# Patient Record
Sex: FEMALE | Race: BLACK OR AFRICAN AMERICAN | Employment: FULL TIME | URBAN - METROPOLITAN AREA
[De-identification: names, ages, dates, MRNs, and addresses within clinical notes are randomized per-mention and may not be internally consistent; named-entity substitution may affect disease eponyms.]

---

## 2022-01-04 ENCOUNTER — APPOINTMENT (OUTPATIENT)
Dept: CT IMAGING | Age: 32
DRG: 070 | End: 2022-01-04
Payer: COMMERCIAL

## 2022-01-04 ENCOUNTER — HOSPITAL ENCOUNTER (INPATIENT)
Age: 32
LOS: 1 days | Discharge: LEFT BEFORE DISCHARGE | DRG: 070 | End: 2022-01-05
Attending: EMERGENCY MEDICINE | Admitting: INTERNAL MEDICINE
Payer: COMMERCIAL

## 2022-01-04 ENCOUNTER — APPOINTMENT (OUTPATIENT)
Dept: GENERAL RADIOLOGY | Age: 32
DRG: 070 | End: 2022-01-04
Payer: COMMERCIAL

## 2022-01-04 DIAGNOSIS — R41.82 ALTERED MENTAL STATUS, UNSPECIFIED ALTERED MENTAL STATUS TYPE: Primary | ICD-10-CM

## 2022-01-04 PROBLEM — G93.40 ACUTE ENCEPHALOPATHY: Status: ACTIVE | Noted: 2022-01-04

## 2022-01-04 LAB
ACETAMINOPHEN LEVEL: <5 UG/ML (ref 10–30)
ALBUMIN SERPL-MCNC: 4.4 G/DL (ref 3.4–5)
ALP BLD-CCNC: 58 U/L (ref 40–129)
ALT SERPL-CCNC: <5 U/L (ref 10–40)
AMMONIA: 15 UMOL/L (ref 11–51)
AMPHETAMINE SCREEN, URINE: ABNORMAL
ANION GAP SERPL CALCULATED.3IONS-SCNC: 22 MMOL/L (ref 3–16)
AST SERPL-CCNC: 16 U/L (ref 15–37)
BARBITURATE SCREEN URINE: ABNORMAL
BASE EXCESS VENOUS: 3 MMOL/L (ref -2–3)
BASOPHILS ABSOLUTE: 0 K/UL (ref 0–0.2)
BASOPHILS RELATIVE PERCENT: 0.5 %
BENZODIAZEPINE SCREEN, URINE: ABNORMAL
BILIRUB SERPL-MCNC: 0.4 MG/DL (ref 0–1)
BILIRUBIN DIRECT: <0.2 MG/DL (ref 0–0.3)
BILIRUBIN URINE: ABNORMAL
BILIRUBIN, INDIRECT: ABNORMAL MG/DL (ref 0–1)
BLOOD, URINE: ABNORMAL
BUN BLDV-MCNC: 38 MG/DL (ref 7–20)
CALCIUM SERPL-MCNC: 9 MG/DL (ref 8.3–10.6)
CANNABINOID SCREEN URINE: POSITIVE
CARBOXYHEMOGLOBIN: 1.3 % (ref 0–1.5)
CHLORIDE BLD-SCNC: 91 MMOL/L (ref 99–110)
CLARITY: ABNORMAL
CO2: 24 MMOL/L (ref 21–32)
COCAINE METABOLITE SCREEN URINE: ABNORMAL
COLOR: YELLOW
CORTISOL TOTAL: 17.9 UG/DL
CREAT SERPL-MCNC: 12.8 MG/DL (ref 0.6–1.1)
EKG ATRIAL RATE: 53 BPM
EKG ATRIAL RATE: 59 BPM
EKG DIAGNOSIS: NORMAL
EKG DIAGNOSIS: NORMAL
EKG P AXIS: 40 DEGREES
EKG P AXIS: 56 DEGREES
EKG P-R INTERVAL: 186 MS
EKG P-R INTERVAL: 196 MS
EKG Q-T INTERVAL: 462 MS
EKG Q-T INTERVAL: 462 MS
EKG QRS DURATION: 72 MS
EKG QRS DURATION: 78 MS
EKG QTC CALCULATION (BAZETT): 433 MS
EKG QTC CALCULATION (BAZETT): 457 MS
EKG R AXIS: 36 DEGREES
EKG R AXIS: 51 DEGREES
EKG T AXIS: 27 DEGREES
EKG T AXIS: 43 DEGREES
EKG VENTRICULAR RATE: 53 BPM
EKG VENTRICULAR RATE: 59 BPM
EOSINOPHILS ABSOLUTE: 0 K/UL (ref 0–0.6)
EOSINOPHILS RELATIVE PERCENT: 0 %
ETHANOL: NORMAL MG/DL (ref 0–0.08)
GFR AFRICAN AMERICAN: 4
GFR NON-AFRICAN AMERICAN: 3
GLUCOSE BLD-MCNC: 126 MG/DL (ref 70–99)
GLUCOSE BLD-MCNC: 67 MG/DL (ref 70–99)
GLUCOSE BLD-MCNC: 68 MG/DL (ref 70–99)
GLUCOSE URINE: NEGATIVE MG/DL
HCG QUALITATIVE: NEGATIVE
HCO3 VENOUS: 28.2 MMOL/L (ref 24–28)
HCT VFR BLD CALC: 41.6 % (ref 36–48)
HEMOGLOBIN, VEN, REDUCED: 4.4 %
HEMOGLOBIN: 13.3 G/DL (ref 12–16)
KETONES, URINE: ABNORMAL MG/DL
LACTIC ACID: 2 MMOL/L (ref 0.4–2)
LEUKOCYTE ESTERASE, URINE: ABNORMAL
LIPASE: 29 U/L (ref 13–60)
LV EF: 60 %
LVEF MODALITY: NORMAL
LYMPHOCYTES ABSOLUTE: 1.1 K/UL (ref 1–5.1)
LYMPHOCYTES RELATIVE PERCENT: 27 %
Lab: ABNORMAL
MCH RBC QN AUTO: 27.8 PG (ref 26–34)
MCHC RBC AUTO-ENTMCNC: 32.1 G/DL (ref 31–36)
MCV RBC AUTO: 86.7 FL (ref 80–100)
METHADONE SCREEN, URINE: ABNORMAL
METHEMOGLOBIN VENOUS: 0.4 % (ref 0–1.5)
MICROSCOPIC EXAMINATION: YES
MONOCYTES ABSOLUTE: 0.6 K/UL (ref 0–1.3)
MONOCYTES RELATIVE PERCENT: 14.4 %
NEUTROPHILS ABSOLUTE: 2.4 K/UL (ref 1.7–7.7)
NEUTROPHILS RELATIVE PERCENT: 58.1 %
NITRITE, URINE: NEGATIVE
O2 SAT, VEN: 96 %
OPIATE SCREEN URINE: ABNORMAL
OXYCODONE URINE: ABNORMAL
PCO2, VEN: 44.5 MMHG (ref 41–51)
PDW BLD-RTO: 16 % (ref 12.4–15.4)
PERFORMED ON: ABNORMAL
PERFORMED ON: ABNORMAL
PH UA: 6
PH UA: 6 (ref 5–8)
PH VENOUS: 7.41 (ref 7.35–7.45)
PHENCYCLIDINE SCREEN URINE: ABNORMAL
PLATELET # BLD: 138 K/UL (ref 135–450)
PMV BLD AUTO: 8.3 FL (ref 5–10.5)
PO2, VEN: 77.9 MMHG (ref 25–40)
POTASSIUM REFLEX MAGNESIUM: 4.6 MMOL/L (ref 3.5–5.1)
PRO-BNP: 2035 PG/ML (ref 0–124)
PROPOXYPHENE SCREEN: ABNORMAL
PROTEIN UA: >=300 MG/DL
RAPID INFLUENZA  B AGN: NEGATIVE
RAPID INFLUENZA A AGN: NEGATIVE
RBC # BLD: 4.8 M/UL (ref 4–5.2)
SALICYLATE, SERUM: 0.8 MG/DL (ref 15–30)
SARS-COV-2, NAAT: NOT DETECTED
SODIUM BLD-SCNC: 137 MMOL/L (ref 136–145)
SPECIFIC GRAVITY UA: >=1.03 (ref 1–1.03)
TCO2 CALC VENOUS: 30 MMOL/L
TOTAL PROTEIN: 8.9 G/DL (ref 6.4–8.2)
TROPONIN: 0.03 NG/ML
TROPONIN: 0.03 NG/ML
TSH SERPL DL<=0.05 MIU/L-ACNC: 182 UIU/ML (ref 0.27–4.2)
URINE TYPE: ABNORMAL
UROBILINOGEN, URINE: 0.2 E.U./DL
WBC # BLD: 4.1 K/UL (ref 4–11)

## 2022-01-04 PROCEDURE — 96376 TX/PRO/DX INJ SAME DRUG ADON: CPT

## 2022-01-04 PROCEDURE — 80048 BASIC METABOLIC PNL TOTAL CA: CPT

## 2022-01-04 PROCEDURE — 84443 ASSAY THYROID STIM HORMONE: CPT

## 2022-01-04 PROCEDURE — 84484 ASSAY OF TROPONIN QUANT: CPT

## 2022-01-04 PROCEDURE — 85025 COMPLETE CBC W/AUTO DIFF WBC: CPT

## 2022-01-04 PROCEDURE — G0378 HOSPITAL OBSERVATION PER HR: HCPCS

## 2022-01-04 PROCEDURE — 36415 COLL VENOUS BLD VENIPUNCTURE: CPT

## 2022-01-04 PROCEDURE — 6360000002 HC RX W HCPCS: Performed by: INTERNAL MEDICINE

## 2022-01-04 PROCEDURE — 82077 ASSAY SPEC XCP UR&BREATH IA: CPT

## 2022-01-04 PROCEDURE — 96375 TX/PRO/DX INJ NEW DRUG ADDON: CPT

## 2022-01-04 PROCEDURE — 87804 INFLUENZA ASSAY W/OPTIC: CPT

## 2022-01-04 PROCEDURE — 87635 SARS-COV-2 COVID-19 AMP PRB: CPT

## 2022-01-04 PROCEDURE — 2500000003 HC RX 250 WO HCPCS: Performed by: INTERNAL MEDICINE

## 2022-01-04 PROCEDURE — 80307 DRUG TEST PRSMV CHEM ANLYZR: CPT

## 2022-01-04 PROCEDURE — 83605 ASSAY OF LACTIC ACID: CPT

## 2022-01-04 PROCEDURE — 83880 ASSAY OF NATRIURETIC PEPTIDE: CPT

## 2022-01-04 PROCEDURE — 99284 EMERGENCY DEPT VISIT MOD MDM: CPT

## 2022-01-04 PROCEDURE — 82140 ASSAY OF AMMONIA: CPT

## 2022-01-04 PROCEDURE — 83690 ASSAY OF LIPASE: CPT

## 2022-01-04 PROCEDURE — 80076 HEPATIC FUNCTION PANEL: CPT

## 2022-01-04 PROCEDURE — 2580000003 HC RX 258: Performed by: INTERNAL MEDICINE

## 2022-01-04 PROCEDURE — 1200000000 HC SEMI PRIVATE

## 2022-01-04 PROCEDURE — 80179 DRUG ASSAY SALICYLATE: CPT

## 2022-01-04 PROCEDURE — 71046 X-RAY EXAM CHEST 2 VIEWS: CPT

## 2022-01-04 PROCEDURE — 96374 THER/PROPH/DIAG INJ IV PUSH: CPT

## 2022-01-04 PROCEDURE — C8929 TTE W OR WO FOL WCON,DOPPLER: HCPCS

## 2022-01-04 PROCEDURE — 90935 HEMODIALYSIS ONE EVALUATION: CPT | Performed by: INTERNAL MEDICINE

## 2022-01-04 PROCEDURE — 82803 BLOOD GASES ANY COMBINATION: CPT

## 2022-01-04 PROCEDURE — 5A1D70Z PERFORMANCE OF URINARY FILTRATION, INTERMITTENT, LESS THAN 6 HOURS PER DAY: ICD-10-PCS | Performed by: INTERNAL MEDICINE

## 2022-01-04 PROCEDURE — 81001 URINALYSIS AUTO W/SCOPE: CPT

## 2022-01-04 PROCEDURE — 93005 ELECTROCARDIOGRAM TRACING: CPT | Performed by: INTERNAL MEDICINE

## 2022-01-04 PROCEDURE — 80143 DRUG ASSAY ACETAMINOPHEN: CPT

## 2022-01-04 PROCEDURE — 84703 CHORIONIC GONADOTROPIN ASSAY: CPT

## 2022-01-04 PROCEDURE — 93005 ELECTROCARDIOGRAM TRACING: CPT | Performed by: STUDENT IN AN ORGANIZED HEALTH CARE EDUCATION/TRAINING PROGRAM

## 2022-01-04 PROCEDURE — 90935 HEMODIALYSIS ONE EVALUATION: CPT

## 2022-01-04 PROCEDURE — 70450 CT HEAD/BRAIN W/O DYE: CPT

## 2022-01-04 PROCEDURE — 99223 1ST HOSP IP/OBS HIGH 75: CPT | Performed by: INTERNAL MEDICINE

## 2022-01-04 PROCEDURE — 82533 TOTAL CORTISOL: CPT

## 2022-01-04 RX ORDER — SODIUM CHLORIDE 0.9 % (FLUSH) 0.9 %
10 SYRINGE (ML) INJECTION PRN
Status: DISCONTINUED | OUTPATIENT
Start: 2022-01-04 | End: 2022-01-05 | Stop reason: HOSPADM

## 2022-01-04 RX ORDER — SODIUM CHLORIDE 0.9 % (FLUSH) 0.9 %
10 SYRINGE (ML) INJECTION EVERY 12 HOURS SCHEDULED
Status: DISCONTINUED | OUTPATIENT
Start: 2022-01-04 | End: 2022-01-05 | Stop reason: HOSPADM

## 2022-01-04 RX ORDER — LORAZEPAM 2 MG/ML
0.5 INJECTION INTRAMUSCULAR EVERY 6 HOURS PRN
Status: DISCONTINUED | OUTPATIENT
Start: 2022-01-04 | End: 2022-01-05 | Stop reason: HOSPADM

## 2022-01-04 RX ORDER — NALOXONE HYDROCHLORIDE 0.4 MG/ML
0.4 INJECTION, SOLUTION INTRAMUSCULAR; INTRAVENOUS; SUBCUTANEOUS ONCE
Status: COMPLETED | OUTPATIENT
Start: 2022-01-04 | End: 2022-01-04

## 2022-01-04 RX ORDER — ONDANSETRON 2 MG/ML
4 INJECTION INTRAMUSCULAR; INTRAVENOUS EVERY 6 HOURS PRN
Status: DISCONTINUED | OUTPATIENT
Start: 2022-01-04 | End: 2022-01-05 | Stop reason: HOSPADM

## 2022-01-04 RX ORDER — POTASSIUM CHLORIDE 7.45 MG/ML
10 INJECTION INTRAVENOUS PRN
Status: DISCONTINUED | OUTPATIENT
Start: 2022-01-04 | End: 2022-01-05 | Stop reason: HOSPADM

## 2022-01-04 RX ORDER — MYCOPHENOLATE MOFETIL 500 MG/1
TABLET ORAL 2 TIMES DAILY
COMMUNITY

## 2022-01-04 RX ORDER — PREDNISONE 10 MG/1
20 TABLET ORAL DAILY
COMMUNITY

## 2022-01-04 RX ORDER — NIFEDIPINE 90 MG/1
90 TABLET, FILM COATED, EXTENDED RELEASE ORAL DAILY
COMMUNITY

## 2022-01-04 RX ORDER — SEVELAMER CARBONATE 800 MG/1
1 TABLET, FILM COATED ORAL
COMMUNITY

## 2022-01-04 RX ORDER — ACETAMINOPHEN 325 MG/1
650 TABLET ORAL EVERY 6 HOURS PRN
Status: DISCONTINUED | OUTPATIENT
Start: 2022-01-04 | End: 2022-01-05 | Stop reason: HOSPADM

## 2022-01-04 RX ORDER — SODIUM CHLORIDE 9 MG/ML
25 INJECTION, SOLUTION INTRAVENOUS PRN
Status: DISCONTINUED | OUTPATIENT
Start: 2022-01-04 | End: 2022-01-05 | Stop reason: HOSPADM

## 2022-01-04 RX ORDER — ACETAMINOPHEN 650 MG/1
650 SUPPOSITORY RECTAL EVERY 6 HOURS PRN
Status: DISCONTINUED | OUTPATIENT
Start: 2022-01-04 | End: 2022-01-05 | Stop reason: HOSPADM

## 2022-01-04 RX ORDER — PROMETHAZINE HYDROCHLORIDE 25 MG/1
12.5 TABLET ORAL EVERY 6 HOURS PRN
Status: DISCONTINUED | OUTPATIENT
Start: 2022-01-04 | End: 2022-01-05 | Stop reason: HOSPADM

## 2022-01-04 RX ORDER — MAGNESIUM SULFATE IN WATER 40 MG/ML
2000 INJECTION, SOLUTION INTRAVENOUS PRN
Status: DISCONTINUED | OUTPATIENT
Start: 2022-01-04 | End: 2022-01-05 | Stop reason: HOSPADM

## 2022-01-04 RX ORDER — METHYLPREDNISOLONE SODIUM SUCCINATE 40 MG/ML
40 INJECTION, POWDER, LYOPHILIZED, FOR SOLUTION INTRAMUSCULAR; INTRAVENOUS ONCE
Status: COMPLETED | OUTPATIENT
Start: 2022-01-04 | End: 2022-01-04

## 2022-01-04 RX ORDER — DEXTROSE MONOHYDRATE 25 G/50ML
25 INJECTION, SOLUTION INTRAVENOUS ONCE
Status: COMPLETED | OUTPATIENT
Start: 2022-01-04 | End: 2022-01-04

## 2022-01-04 RX ADMIN — ONDANSETRON 4 MG: 2 INJECTION INTRAMUSCULAR; INTRAVENOUS at 19:37

## 2022-01-04 RX ADMIN — NALOXONE HYDROCHLORIDE 0.4 MG: 0.4 INJECTION, SOLUTION INTRAMUSCULAR; INTRAVENOUS; SUBCUTANEOUS at 04:07

## 2022-01-04 RX ADMIN — LORAZEPAM 0.5 MG: 2 INJECTION INTRAMUSCULAR; INTRAVENOUS at 16:30

## 2022-01-04 RX ADMIN — DEXTROSE MONOHYDRATE 25 G: 25 INJECTION, SOLUTION INTRAVENOUS at 04:06

## 2022-01-04 RX ADMIN — SODIUM CHLORIDE, PRESERVATIVE FREE 10 ML: 5 INJECTION INTRAVENOUS at 21:44

## 2022-01-04 RX ADMIN — SODIUM CHLORIDE, PRESERVATIVE FREE 10 ML: 5 INJECTION INTRAVENOUS at 13:45

## 2022-01-04 RX ADMIN — METHYLPREDNISOLONE SODIUM SUCCINATE 40 MG: 40 INJECTION, POWDER, FOR SOLUTION INTRAMUSCULAR; INTRAVENOUS at 13:44

## 2022-01-04 RX ADMIN — ONDANSETRON 4 MG: 2 INJECTION INTRAMUSCULAR; INTRAVENOUS at 06:00

## 2022-01-04 ASSESSMENT — ENCOUNTER SYMPTOMS
SHORTNESS OF BREATH: 0
BACK PAIN: 0
VOMITING: 1
ABDOMINAL PAIN: 0
COUGH: 0
EYE REDNESS: 0
EYE DISCHARGE: 0
RHINORRHEA: 0
NAUSEA: 1
DIARRHEA: 0

## 2022-01-04 NOTE — PROGRESS NOTES
4 Eyes Admission Assessment     I agree as the admission nurse that 2 RN's have performed a thorough Head to Toe Skin Assessment on the patient. ALL assessment sites listed below have been assessed on admission. Areas assessed by both nurses:   [x]   Head, Face, and Ears   [x]   Shoulders, Back, and Chest  [x]   Arms, Elbows, and Hands   [x]   Coccyx, Sacrum, and Ischium  [x]   Legs, Feet, and Heels        Does the Patient have Skin Breakdown?   No         Donovan Prevention initiated:  No   Wound Care Orders initiated:  No      [facility] nurse consulted for Pressure Injury (Stage 3,4, Unstageable, DTI, NWPT, and Complex wounds) or Donovan score 18 or lower:  No      Nurse 1 eSignature: Electronically signed by Jagjit Her RN on 1/4/22 at 7:17 AM EST    **SHARE this note so that the co-signing nurse is able to place an eSignature**    Nurse 2 eSignature: Electronically signed by Aarti Mejia RN on 1/4/22 at 7:54 AM EST

## 2022-01-04 NOTE — H&P
Hospital Medicine History & Physical      PCP: No primary care provider on file. Date of Admission: 1/4/2022    Date of Service: 1/4/22    Chief Complaint:  37 yo female admitted with change in mental status       History Of Present Illness:   dialsis Sunday - Tuesday Thursday Saturday    36 y.o. female who presented to [facility] w altered mentation. The patient does not relay a good history given her ongoing confusion and change in mentation. I discussed the case at length with patients partner Yosef Patel (Domestic Partner) [phone] (Mobile) who brought her into the ER. According to him, the patient went to bed ok, she took baclofen and tylenol and then woke up sometime last night and had altered mental status. He is not aware whether patient has been taking the correct medications. I reviewed care everywhere and it does not show much nor does it address correct dose of steroids or other medications. He states that the patient underwent hemodialysis on Sunday which was a change from her prior schedule of Tues/Thurs/saturday. Currently she is not relaying any complaints is however altered. Past Medical History:          Diagnosis Date    Kidney stone     Lupus [facility])        Past Surgical History:      History reviewed. No pertinent surgical history. Medications Prior to Admission:      Prior to Admission medications    Medication Sig Start Date End Date Taking? Authorizing Provider   NIFEdipine (ADALAT CC) 90 MG extended release tablet Take 90 mg by mouth daily   Yes Historical Provider, MD   predniSONE (DELTASONE) 10 MG tablet Take 10 mg by mouth daily   Yes Historical Provider, MD   sevelamer (RENVELA) 800 MG tablet Take 1 tablet by mouth 3 times daily (with meals)   Yes Historical Provider, MD   mycophenolate (CELLCEPT) 500 MG tablet Take by mouth 2 times daily   Yes Historical Provider, MD       Allergies:  Patient has no known allergies.     Social History:      The patient currently lives at home    TOBACCO:   reports that she has never smoked. She has never used smokeless tobacco.  ETOH:   reports previous alcohol use. E-Cigarettes/Vaping Use     Questions Responses    E-Cigarette/Vaping Use     Start Date     Passive Exposure     Quit Date     Counseling Given     Comments             Family History:       Reviewed in detail and negative for DM, CAD, Cancer, CVA. Positive as follows:    I cannot elicit this from patient     REVIEW OF SYSTEMS COMPLETED:   Pertinent positives as noted in the HPI. All other systems reviewed and negative. PHYSICAL EXAM PERFORMED:    BP (!) 109/53   Pulse 50   Temp 97.7 °F (36.5 °C) (Oral)   Resp 16   Ht 5' 6\" (1.676 m)   Wt 146 lb 9.7 oz (66.5 kg)   SpO2 100%   BMI 23.66 kg/m²     General appearance:  No apparent distress, appears stated age and cooperative. HEENT:  Normal cephalic, atraumatic without obvious deformity. Pupils equal, round, and reactive to light. Extra ocular muscles intact. Conjunctivae/corneas clear. Exophthalmos noted   Neck: Supple, with full range of motion. No jugular venous distention. Trachea midline. Respiratory:  Normal respiratory effort. Clear to auscultation, bilaterally without Rales/Wheezes/Rhonchi. Cardiovascular:  Regular rate and rhythm with normal S1/S2 without murmurs, rubs or gallops. Abdomen: Soft, non-tender, non-distended with normal bowel sounds. Musculoskeletal:  No clubbing, cyanosis or edema bilaterally. Full range of motion without deformity. Skin: Skin color, texture, turgor normal.  No rashes or lesions. Neurologic:  Neurovascularly intact without any focal sensory/motor deficits. Cranial nerves: II-XII intact, grossly non-focal.  However she is alert but does not appropriately respond verbally.   She nods her head yes and no to to endorse hunger, she nods yes to knowing she is in the hospital.   Capillary Refill: Brisk,3 seconds, normal  Peripheral Pulses: +2 palpable, equal bilaterally Labs:     Recent Labs     01/04/22 0102   WBC 4.1   HGB 13.3   HCT 41.6        Recent Labs     01/04/22 0102      K 4.6   CL 91*   CO2 24   BUN 38*   CREATININE 12.8*   CALCIUM 9.0     Recent Labs     01/04/22 0102   AST 16   ALT <5*   BILIDIR <0.2   BILITOT 0.4   ALKPHOS 58     No results for input(s): INR in the last 72 hours. Recent Labs     01/04/22 0102 01/04/22  0710   TROPONINI 0.03* 0.03*       Urinalysis:    No results found for: Loel Laughter, [address], BACTERIA, RBCUA, BLOODU, [address], GLUCOSEU    Radiology:     CXR: I have reviewed the CXR with the following interpretation: no pericardial effusion noted no obvious infiltrate  EKG:  I have reviewed the EKG with the following interpretation: sinus bradycardia 59  Ct head - normal no acute issues     XR CHEST (2 VW)   Final Result      No evidence for acute cardiopulmonary disease. CT Head WO Contrast   Final Result      1. No intracranial hemorrhage, mass effect or large acute territorial infarction          ASSESSMENT:    Active Hospital Problems    Diagnosis Date Noted    Acute encephalopathy [G93.40] 01/04/2022         PLAN:    37 yo female admitted with acute metabolic encephalopathy - her bun isnt terribly high. I dont know whether this is or isn't related to dialysis however she will undergo dialysis and will monitor. I am however concerned about her home medications. The patient is hypotensive here and on two separate bp medications at home, she was also hypoglycemic. I have ordered a stat cortisol level and after drawn I will administer iv solumedrol. I do not think the patient is acutely infected. The patient does not have nuchal rigidity, she does not appear toxic. She endorses hunger. Await further labs and monitor.        DVT Prophylaxis: will do heparin  Diet: No diet orders on file  Code Status: Full Code    PT/OT Eval Status: will assess later     Dispo - continue inpatient stay        Yana Andersen MD    Thank you No primary care provider on file. for the opportunity to be involved in this patient's care. If you have any questions or concerns please feel free to contact me at [phone].

## 2022-01-04 NOTE — CARE COORDINATION
Case Management Assessment           Initial Evaluation                Date / Time of Evaluation: 1/4/2022 2:33 PM                 Assessment Completed by: Sergio Montana RN     Patient is from home with her boyfriend and is normally independent at baseline. She does not drive but has [employer] for transportation to dialysis on TTS at [facility]. They live in a third floor apt. Patient Name: Gail Sorenson     YOB: 1990  Diagnosis: Acute encephalopathy [G93.40]  Altered mental status, unspecified altered mental status type [R41.82]     Date / Time: 1/4/2022 12:22 AM    Patient Admission Status: Inpatient    If patient is discharged prior to next notation, then this note serves as note for discharge by case management. Current PCP: No primary care provider on file. Clinic Patient: No    Chart Reviewed: Yes  Patient/ Family Interviewed: Yes    Initial assessment completed at bedside with: patient and then spoke with boyfriend by phone    Hospitalization in the last 30 days: No    Emergency Contacts:  Extended Emergency Contact Information  Primary Emergency Contact: Richard Zambrano  Address: [facility] [address], [address] [place] [address] Phone: [phone]  Mobile Phone: [phone]  Relation: Domestic Partner   needed? No    Advance Directives:   Code Status: Full Code    Healthcare Power of : No  Agent: NA  Contact Number: NA      Financial  Payor: Patricia Chris / Plan: [place] / Product Type: *No Product type* /     Pre-cert required for SNF: Yes    Pharmacy  No Pharmacies Listed    Potential assistance Purchasing Medications:    Does Patient want to participate in local refill/ meds to beds program?:      Meds To Beds General Rules:  1. Can ONLY be done Monday- Friday between 8:30am-5pm  2. Prescription(s) must be in pharmacy by 3pm to be filled same day  3. Copy of patient's insurance/ prescription drug card and patient face sheet must be sent along with the prescription(s)  4. Cost of Rx cannot be added to hospital bill. If financial assistance is needed, please contact unit  or ;  or  CANNOT provide pharmacy voucher for patients co-pays  5. Patients can then  the prescription on their way out of the hospital at discharge, or pharmacy can deliver to the bedside if staff is available. (payment due at time of pick-up or delivery - cash, check, or card accepted)     Able to afford home medications/ co-pay costs: Yes    ADLS  Support Systems:      PT AM-PAC:   /24  OT AM-PAC:   /24    [place]: apt  Steps: 3rd floor    Plans to RETURN to current housing: Yes  Barriers to RETURNING to current housing: none noted    Dialysis  Active with HD/PD prior to admission: Yes  Nephrologist:     HD Center:  [facility]cd[facility]  Address: [address] [place] [address],[facility], [address]  Phone: [phone]    DISCHARGE PLAN:  Disposition: Home- No Services Needed    Transportation PLAN for discharge: family     Factors facilitating achievement of predicted outcomes: Family support and Friend support    Barriers to discharge: ECHO results pending, confused    Additional Case Management Notes: NA    The Plan for Transition of Care is related to the following treatment goals of Acute encephalopathy [G93.40]  Altered mental status, unspecified altered mental status type [R41.82]    The Patient and/or patient representative [facility] and her family were provided with a choice of provider and agrees with the discharge plan Not Indicated    Freedom of choice list was provided with basic dialogue that supports the patient's individualized plan of care/goals and shares the quality data associated with the providers.  Not Indicated    Care Transition patient: Maggy Navarrete RN  [facility]  Case Management Department  Ph: [phone]   Fax: [phone]

## 2022-01-04 NOTE — CONSULTS
Pharmacy Consult Note  - Admission Medication Reconciliation      Pharmacy consulted for reconciliation of vigvr-ls-nbcbygnls medications. I reviewed Rx fill history via \"Complete Dispense Report\" in [place], and spoke to patient's domestic partner, NICOLE PEACOCK ([phone]) on phone. NOTE: Compliance issues-   · [employer] shows below Rx's last (and only) fills for 30-day supply on 5/6/21. · NICOLE PEACOCK read from her bottles at home to me. He said he found pt sleeping when he came home yesterday and assumed she did not take meds, but thinks she took them the day prior. · I spoke to pharmacist at [facility]- confirmed Rx'x last filled last May. Dose or Frequency CHANGE:  1) Prednisone- instructions on bottle are 10 mg x 2 tabs (20 mg total) daily. No current facility-administered medications on file prior to encounter. Current Outpatient Medications on File Prior to Encounter   Medication Sig Dispense Refill    NIFEdipine (ADALAT CC) 90 MG extended release tablet Take 90 mg by mouth daily      predniSONE (DELTASONE) 10 MG tablet Take 20 mg by mouth daily       sevelamer (RENVELA) 800 MG tablet Take 1 tablet by mouth 3 times daily (with meals)      mycophenolate (CELLCEPT) 500 MG tablet Take by mouth 2 times daily         Thank you for the consult   Please call with questions:    Delwin Box  Pharm. D. BCPS    1/4/2022 12:14 PM  [phone] (wireless)  [phone]  (office)

## 2022-01-04 NOTE — CONSULTS
Nephrology Consult Note                                                                                                                                                                                                                                                                                                                                                               Office : [phone]     Fax :[phone]              Patient's Name: Katlyn Martinez  8:56 AM  1/4/2022    Reason for Consult:  ESRD   Requesting Physician:  No primary care provider on file. Chief Complaint:  Confusion     History of Present Ilness:    Katlyn Martinez is a 36 y.o. 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 No pertinent surgical history. History reviewed. No pertinent family history. reports that she has never smoked. She has never used smokeless tobacco. She reports previous alcohol use. She reports previous drug use. Allergies:  Patient has no known allergies.     Current Medications:    sodium chloride flush 0.9 % injection 10 mL, 2 times per day  sodium chloride flush 0.9 % injection 10 mL, PRN  0.9 % sodium chloride infusion, PRN  potassium chloride 10 mEq/100 mL IVPB (Peripheral Line), PRN  magnesium sulfate 2000 mg in 50 mL IVPB premix, PRN  promethazine (PHENERGAN) tablet 12.5 mg, Q6H PRN   Or  ondansetron (ZOFRAN) injection 4 mg, Q6H PRN  acetaminophen (TYLENOL) tablet 650 mg, Q6H PRN   Or  acetaminophen (TYLENOL) suppository 650 mg, Q6H PRN  perflutren lipid microspheres (DEFINITY) injection 1.65 mg, ONCE PRN        Review of Systems:   Lethargic     Physical exam:     Vitals:  BP (!) 109/53   Pulse 50   Temp 97.7 °F (36.5 °C) (Oral)   Resp 16   Ht 5' 6\" (1.676 m)   Wt 146 lb 9.7 oz (66.5 kg)   SpO2 100%   BMI 23.66 kg/m²   Constitutional:  Lethargic   Skin: no rash, turgor wnl  Heent:  eomi, mmm  Neck: no bruits or jvd noted  Cardiovascular:  S1, S2 without m/r/g  Respiratory: CTA B without w/r/r  Abdomen:  +bs, soft, nt, nd  Ext: + lower extremity edema  Psychiatric: mood and affect flat   Musculoskeletal:  Rom, muscular strength intact    Data:   Labs:  CBC:   Recent Labs     01/04/22 0102   WBC 4.1   HGB 13.3        BMP:    Recent Labs     01/04/22 0102      K 4.6   CL 91*   CO2 24   BUN 38*   CREATININE 12.8*   GLUCOSE 67*     Ca/Mg/Phos:   Recent Labs     01/04/22 0102   CALCIUM 9.0     Hepatic:   Recent Labs     01/04/22 0102   AST 16   ALT <5*   BILITOT 0.4   ALKPHOS 58     Troponin:   Recent Labs     01/04/22 0102 01/04/22  0710   TROPONINI 0.03* 0.03*     BNP: No results for input(s): BNP in the last 72 hours. Lipids: No results for input(s): CHOL, TRIG, HDL, LDLCALC, LABVLDL in the last 72 hours. ABGs: No results for input(s): PHART, PO2ART, JBI0EWV in the last 72 hours. INR: No results for input(s): INR in the last 72 hours. UA:No results for input(s): Eh Zapiengill, GLUCOSEU, BILIRUBINUR, Dewight [place], BLOODU, PHUR, PROTEINU, UROBILINOGEN, NITRU, LEUKOCYTESUR, LABMICR, URINETYPE in the last 72 hours. Urine Microscopic: No results for input(s): LABCAST, BACTERIA, COMU, HYALCAST, WBCUA, RBCUA, EPIU in the last 72 hours. Urine Culture: No results for input(s): LABURIN in the last 72 hours. Urine Chemistry: No results for input(s): Cynthia Shivers, PROTEINUR, NAUR in the last 72 hours. IMAGING:  XR CHEST (2 VW)   Final Result      No evidence for acute cardiopulmonary disease. CT Head WO Contrast   Final Result      1. No intracranial hemorrhage, mass effect or large acute territorial infarction          Assessment/Plan   1. ESRD     2. HTN    3.  Anemia    4. Acid- base/

## 2022-01-04 NOTE — ED PROVIDER NOTES
[address]          EM RESIDENT NOTE       Date of evaluation: 1/4/2022    Chief Complaint     Altered Mental Status (fiancee reports AMS and vomitting for pt since earlier today - unknown last well known )      History of Present Illness     Ming Diaz is a 36 y.o. female who presents with nausea, vomiting, and altered mental status. Patient states that her symptoms started earlier today, she reports predominantly nausea and vomiting. Her fiancé who is at bedside indicates that he noticed she started becoming more confused this AM.  The patient has a history of lupus, she states that she is on CellCept but no other medications. When asked about dialysis, patient states that she does not receive dialysis, but her fiancé indicates that she does. Her last session was on Sunday, 1/2/2021. No fevers, no chest pain, and no difficulty breathing. Patient has no seizure history risk. No history of illicit or recreational drug use. Review of Systems     Review of Systems   Constitutional: Negative for fever. HENT: Negative for ear discharge and rhinorrhea. Eyes: Negative for discharge and redness. Respiratory: Negative for cough and shortness of breath. Cardiovascular: Negative for chest pain and palpitations. Gastrointestinal: Positive for nausea and vomiting. Negative for abdominal pain and diarrhea. Genitourinary: Negative for difficulty urinating and flank pain. Musculoskeletal: Negative for back pain and joint swelling. Skin: Negative for rash. Neurological: Negative for syncope and headaches. Psychiatric/Behavioral: Positive for confusion. All other systems reviewed and are negative. Past Medical, Surgical, Family, and Social History     She has a past medical history of Kidney stone and Lupus ([facility] [address] She has no past surgical history on file. Her family history is not on file. She reports that she has never smoked.  She has never used smokeless tobacco. She reports previous alcohol use. She reports previous drug use. Medications     Previous Medications    No medications on file       Allergies     She has No Known Allergies. Physical Exam     INITIAL VITALS: BP: 120/76, Temp: 98.1 °F (36.7 °C), Pulse: 50, Resp: 18, SpO2: 97 %   Physical Exam  Vitals and nursing note reviewed. Constitutional:       General: She is not in acute distress. Appearance: She is well-developed. HENT:      Head: Normocephalic and atraumatic. Eyes:      Pupils: Pupils are equal, round, and reactive to light. Cardiovascular:      Rate and Rhythm: Normal rate and regular rhythm. Heart sounds: No murmur heard. No friction rub. No gallop. Pulmonary:      Effort: Pulmonary effort is normal. No respiratory distress. Breath sounds: Normal breath sounds. No stridor. No wheezing, rhonchi or rales. Chest:      Chest wall: No tenderness. Abdominal:      General: There is no distension. Palpations: Abdomen is soft. Tenderness: There is no abdominal tenderness. There is no guarding. Musculoskeletal:      Cervical back: Neck supple. Skin:     General: Skin is warm and dry. Capillary Refill: Capillary refill takes less than 2 seconds. Findings: No erythema. Neurological:      Mental Status: She is alert. GCS: GCS eye subscore is 3. GCS verbal subscore is 4. GCS motor subscore is 6. Cranial Nerves: No cranial nerve deficit or dysarthria. Comments: Patient is sleeping. She arouses easily to stimulation, she is able to state her name, but is unable to state where we are, or why she is here. Psychiatric:         Behavior: Behavior normal.         DiagnosticResults     EKG   Interpreted in conjunction with emergencydepartment physician No att. providers found  Sinus bradycardia with a rate of 53 bpm.  Normal axis. No ectopy. Normal conduction without significant QTC prolongation or conduction delays. Range    Lipase 29.0 13.0 - 60.0 U/L   Troponin   Result Value Ref Range    Troponin 0.03 (H) <0.01 ng/mL   Brain Natriuretic Peptide   Result Value Ref Range    Pro-BNP 2,035 (H) 0 - 124 pg/mL   Lactic Acid, Plasma   Result Value Ref Range    Lactic Acid 2.0 0.4 - 2.0 mmol/L   HCG Qualitative, Serum   Result Value Ref Range    hCG Qual Negative Detects HCG level >10 MIU/mL       ED BEDSIDE ULTRASOUND:  None     RECENT VITALS:  BP: 120/76, Temp: 98.1 °F (36.7 °C), Pulse: 50,Resp: 18, SpO2: 97 %     Procedures     None     ED Course     Nursing Notes, Past Medical Hx, Past Surgical Hx, Social Hx, Allergies, and Family Hx were reviewed. The patient was given the followingmedications:  No orders of the defined types were placed in this encounter. CONSULTS:  Maegan Galaviz / ASSESSMENT / Ish Germaine is a 36 y.o. female with a past medical history of lupus nephritis on dialysis, who presents with altered mental status. Patient is unable to provide meaningful history. Initial suspicion for patient's presentation is broad, including metabolic derangements, uremic encephalopathy being the 2 main considerations. Patient's vital signs are reassuring, she was noted to have bradycardia, with with a pulse of 50, and going down to the 40s during evaluation. Her blood pressures are otherwise within normal limits. I have low suspicion for infectious triggers for her presentation today. She does not appear to be volume overloaded on my presentation, without signs of hypoxia or crackles on exam.  Patient does not have a seizure history, has no witnessed seizure activity, and does not appear to be postictal.  We will plan to proceed with a broad metabolic work-up, and obtain a head CT given her altered mental status without any history of similar presentations. The patient's lab work-up was notable for creatinine of 12.8, although her BUN is only noted to be 38.   Her potassium is 4.6, does not have any corresponding EKG findings and does not require intervention at this time. She does not have a white blood cell count, she has a negative head CT, and no pulmonary edema appreciated on chest x-ray. The only abnormality notable is an elevated proBNP, although we do not have any priors for comparison. Patient's glucose slightly low as well, repeated and noted to be at 68. Patient history is notable for since receiving dialysis about 2 days ago, although unknown if patient has attended. I suspect that her symptoms may be due in part to needing dialysis. I have lower suspicion for CNS infectious symptoms, as patient does not have any additional symptoms along with this. Her abdomen is otherwise soft, nontender, with reassuring LFTs. Given her persistent symptoms, we will plan to admit to the hospital for dialysis as well as additional work-up. This patient was also evaluated by the attending physician. All care plans werediscussed and agreed upon. Clinical Impression     1. Altered mental status, unspecified altered mental status type        Disposition     PATIENT REFERRED TO:  No follow-up provider specified.     DISCHARGE MEDICATIONS:  New Prescriptions    No medications on file       DISPOSITION Decision To Admit 01/04/2022 02:44:57 AM       Guevara Gibbons MD  Resident  01/04/22 [id number]       Guevara Gibbons MD  Resident  01/04/22 [id number]

## 2022-01-04 NOTE — PROGRESS NOTES
Treatment time: 3    Net UF: 400    Pre weight: 65  Post weight: 64.7  EDW: tbd    Access used: avg  Access function: well, pt moves excessively alarming machine several times    Medications or blood products given: none    Regular outpatient schedule: TTS    Summary of response to treatment: tolerated well. Moving excessively t/o tx. No other issues noted. Report called to primary RN. Pt stable upon leaving unit    Copy of dialysis treatment record placed in chart, to be scanned into EMR.

## 2022-01-04 NOTE — PROGRESS NOTES
Pt is a/o x4 though intermittently confused with delayed responses. No acute changes in neuro status. Pt able to follow commands. Pt having decreased appetite d/t nausea. Nausea relieved with antiemetic. Pt sleepy this shift and yet to get OOB - though ambulates independently at baseline per boyfriend. All fall precautions in place.

## 2022-01-05 VITALS
BODY MASS INDEX: 22.92 KG/M2 | RESPIRATION RATE: 18 BRPM | OXYGEN SATURATION: 98 % | HEIGHT: 66 IN | HEART RATE: 60 BPM | DIASTOLIC BLOOD PRESSURE: 82 MMHG | SYSTOLIC BLOOD PRESSURE: 124 MMHG | TEMPERATURE: 99.6 F | WEIGHT: 142.64 LBS

## 2022-01-05 LAB
A/G RATIO: 0.8 (ref 1.1–2.2)
ALBUMIN SERPL-MCNC: 4.4 G/DL (ref 3.4–5)
ALP BLD-CCNC: 58 U/L (ref 40–129)
ALT SERPL-CCNC: <5 U/L (ref 10–40)
AMORPHOUS: ABNORMAL /HPF
ANION GAP SERPL CALCULATED.3IONS-SCNC: 20 MMOL/L (ref 3–16)
ANISOCYTOSIS: ABNORMAL
AST SERPL-CCNC: 19 U/L (ref 15–37)
BACTERIA: ABNORMAL /HPF
BASOPHILS ABSOLUTE: 0 K/UL (ref 0–0.2)
BASOPHILS RELATIVE PERCENT: 0.2 %
BILIRUB SERPL-MCNC: 0.4 MG/DL (ref 0–1)
BUN BLDV-MCNC: 26 MG/DL (ref 7–20)
CALCIUM SERPL-MCNC: 9.8 MG/DL (ref 8.3–10.6)
CHLORIDE BLD-SCNC: 88 MMOL/L (ref 99–110)
CO2: 29 MMOL/L (ref 21–32)
CREAT SERPL-MCNC: 9.1 MG/DL (ref 0.6–1.1)
EOSINOPHILS ABSOLUTE: 0 K/UL (ref 0–0.6)
EOSINOPHILS RELATIVE PERCENT: 0 %
EPITHELIAL CELLS, UA: ABNORMAL /HPF (ref 0–5)
FINE CASTS, UA: ABNORMAL /LPF (ref 0–2)
GFR AFRICAN AMERICAN: 6
GFR NON-AFRICAN AMERICAN: 5
GLUCOSE BLD-MCNC: 68 MG/DL (ref 70–99)
HCT VFR BLD CALC: 42.5 % (ref 36–48)
HEMOGLOBIN: 13.5 G/DL (ref 12–16)
LYMPHOCYTES ABSOLUTE: 0.8 K/UL (ref 1–5.1)
LYMPHOCYTES RELATIVE PERCENT: 21.4 %
MCH RBC QN AUTO: 27.8 PG (ref 26–34)
MCHC RBC AUTO-ENTMCNC: 31.8 G/DL (ref 31–36)
MCV RBC AUTO: 87.5 FL (ref 80–100)
MONOCYTES ABSOLUTE: 0.5 K/UL (ref 0–1.3)
MONOCYTES RELATIVE PERCENT: 14.4 %
NEUTROPHILS ABSOLUTE: 2.3 K/UL (ref 1.7–7.7)
NEUTROPHILS RELATIVE PERCENT: 64 %
OCCULT BLOOD DIAGNOSTIC: NORMAL
PDW BLD-RTO: 16.1 % (ref 12.4–15.4)
PLATELET # BLD: 131 K/UL (ref 135–450)
PLATELET SLIDE REVIEW: ADEQUATE
PMV BLD AUTO: 8.7 FL (ref 5–10.5)
POTASSIUM REFLEX MAGNESIUM: 4.5 MMOL/L (ref 3.5–5.1)
RBC # BLD: 4.85 M/UL (ref 4–5.2)
RBC UA: ABNORMAL /HPF (ref 0–4)
SODIUM BLD-SCNC: 137 MMOL/L (ref 136–145)
TOTAL PROTEIN: 9.8 G/DL (ref 6.4–8.2)
WBC # BLD: 3.6 K/UL (ref 4–11)
WBC UA: ABNORMAL /HPF (ref 0–5)

## 2022-01-05 PROCEDURE — 80053 COMPREHEN METABOLIC PANEL: CPT

## 2022-01-05 PROCEDURE — 85025 COMPLETE CBC W/AUTO DIFF WBC: CPT

## 2022-01-05 PROCEDURE — 82270 OCCULT BLOOD FECES: CPT

## 2022-01-05 PROCEDURE — 2580000003 HC RX 258: Performed by: INTERNAL MEDICINE

## 2022-01-05 PROCEDURE — 99232 SBSQ HOSP IP/OBS MODERATE 35: CPT | Performed by: INTERNAL MEDICINE

## 2022-01-05 PROCEDURE — G0378 HOSPITAL OBSERVATION PER HR: HCPCS

## 2022-01-05 PROCEDURE — 36415 COLL VENOUS BLD VENIPUNCTURE: CPT

## 2022-01-05 RX ADMIN — SODIUM CHLORIDE, PRESERVATIVE FREE 10 ML: 5 INJECTION INTRAVENOUS at 08:00

## 2022-01-05 NOTE — PROGRESS NOTES
Patient left AMA, signed AMA form in the patients chart. Doctor notified and aware. Patient taken down via wheelchair to meet ride.

## 2022-01-05 NOTE — PROGRESS NOTES
Pt alert. Oriented to self and place only. Delayed responses noted. Follows commands. Voiding adequately via BRP. Tolerating ambulation well contact guard with a GB. Denies n/v. Pt has not slept for most of shift. Fall precautions are in place.

## 2022-01-05 NOTE — PROGRESS NOTES
PROCEDURE:  Patient seen on hemodialysis   PHYSICIAN:  Anette Colvin MD    INDICATION:  End-stage renal disease    Wt Readings from Last 3 Encounters:   01/04/22 142 lb 10.2 oz (64.7 kg)     Temp Readings from Last 3 Encounters:   01/04/22 98.3 °F (36.8 °C) (Oral)     BP Readings from Last 3 Encounters:   01/04/22 111/63     Pulse Readings from Last 3 Encounters:   01/04/22 57      In: 520 [P.O.:120]  Out: 800      CBC:   Recent Labs     01/04/22  0102   WBC 4.1   HGB 13.3        BMP:    Recent Labs     01/04/22  0102      K 4.6   CL 91*   CO2 24   BUN 38*   CREATININE 12.8*   GLUCOSE 67*     BMD:   Lab Results   Component Value Date    CALCIUM 9.0 01/04/2022       Iron:   No results found for: IRON, TIBC, FERRITIN         RX:  See dialysis flowsheet for specifics on access, blood flow rate, dialysate baths, duration of dialysis, anticoagulation and other technical information.     COMMENTS:      Anette Colvin MD, MD  Cell - [phone]  Pager -  [phone]

## 2022-01-05 NOTE — PLAN OF CARE
Problem: Skin Integrity:  Goal: Absence of new skin breakdown  Description: Absence of new skin breakdown  Outcome: Ongoing  Note: No skin breakdown noted. Pt moving independently in the bed. Reminders to turn frequently given. Problem: Falls - Risk of:  Goal: Will remain free from falls  Description: Will remain free from falls  Outcome: Ongoing  Note: Fall precautions are in place. Pt is in bed with bed alarm on. Camera in place for safety. Call light and belongings are within reach.

## 2022-01-05 NOTE — PLAN OF CARE
Problem: Skin Integrity:  Goal: Will show no infection signs and symptoms  Description: Will show no infection signs and symptoms  Outcome: Completed     Problem: Skin Integrity:  Goal: Absence of new skin breakdown  Description: Absence of new skin breakdown  1/5/2022 0839 by Gera Soto RN  Outcome: Completed     Problem: Non-Violent Restraints  Goal: Removal from restraints as soon as assessed to be safe  Outcome: Completed     Problem: Non-Violent Restraints  Goal: No harm/injury to patient while restraints in use  Outcome: Completed     Problem: Non-Violent Restraints  Goal: Patient's dignity will be maintained  Outcome: Completed     Problem: Falls - Risk of:  Goal: Will remain free from falls  Description: Will remain free from falls  1/5/2022 0839 by Gera Soto RN  Outcome: Completed     Problem: Falls - Risk of:  Goal: Absence of physical injury  Description: Absence of physical injury  Outcome: Completed

## 2022-01-05 NOTE — PROGRESS NOTES
Patient alert, oriented to self and place. In chair. Re-educated on importance of safety of calling for assistance when needing to get up.

## 2022-01-06 NOTE — PROGRESS NOTES
Nephrology  Note                                                                                                                                                                                                                                                                                                                                                               Office : [phone]     Fax :[phone]              Patient's Name: Nicky Aschoff    Reason for Consult:  ESRD   Requesting Physician:  No primary care provider on file. Pt want to leave AMA   S/p HD yesterday     Past Medical History:   Diagnosis Date    Kidney stone     Lupus ([address]        History reviewed. No pertinent surgical history. History reviewed. No pertinent family history. reports that she has never smoked. She has never used smokeless tobacco. She reports previous alcohol use. She reports previous drug use. Allergies:  Patient has no known allergies. Current Medications:    No current facility-administered medications for this encounter.       Review of Systems:   Lethargic     Physical exam:     Vitals:  /82   Pulse 60   Temp 99.6 °F (37.6 °C) (Oral)   Resp 18   Ht 5' 6\" (1.676 m)   Wt 142 lb 10.2 oz (64.7 kg)   SpO2 98%   BMI 23.02 kg/m²   Constitutional:  Lethargic   Skin: no rash, turgor wnl  Heent:  eomi, mmm  Neck: no bruits or jvd noted  Cardiovascular:  S1, S2 without m/r/g  Respiratory: CTA B without w/r/r  Abdomen:  +bs, soft, nt, nd  Ext: + lower extremity edema  Psychiatric: mood and affect flat   Musculoskeletal:  Rom, muscular strength intact    Data:   Labs:  CBC:   Recent Labs     01/04/22  0102 01/05/22  0636   WBC 4.1 3.6*   HGB 13.3 13.5    131*     BMP:    Recent Labs     01/04/22  0102 01/05/22  0636    137   K 4.6 4.5   CL 91* 88*   CO2 24 29   BUN 38* 26*   CREATININE 12.8* 9.1*   GLUCOSE 67* 68*     Ca/Mg/Phos:   Recent Labs     01/04/22  0102 01/05/22  0636   CALCIUM 9.0 9.8     Hepatic:   Recent Labs     01/04/22  0102 01/05/22  0636   AST 16 19   ALT <5* <5*   BILITOT 0.4 0.4   ALKPHOS 58 58     Troponin:   Recent Labs     01/04/22  0102 01/04/22  0710   TROPONINI 0.03* 0.03*     BNP: No results for input(s): BNP in the last 72 hours. Lipids: No results for input(s): CHOL, TRIG, HDL, LDLCALC, LABVLDL in the last 72 hours. ABGs: No results for input(s): PHART, PO2ART, HZU9VCR in the last 72 hours. INR: No results for input(s): INR in the last 72 hours. UA:  Recent Labs     01/04/22  2300   COLORU Yellow   CLARITYU SL CLOUDY*   GLUCOSEU Negative   BILIRUBINUR SMALL*   KETUA TRACE*   SPECGRAV >=1.030   BLOODU TRACE-INTACT*   PHUR 6.0  6.0   PROTEINU >=300*   UROBILINOGEN 0.2   NITRU Negative   LEUKOCYTESUR TRACE*   LABMICR YES   URINETYPE NotGiven      Urine Microscopic:   Recent Labs     01/04/22  2300   BACTERIA 3+*   WBCUA 21-50*   RBCUA 0-2   EPIU 11-20*     Urine Culture: No results for input(s): LABURIN in the last 72 hours. Urine Chemistry: No results for input(s): Gerre Lees, PROTEINUR, NAUR in the last 72 hours. IMAGING:  XR CHEST (2 VW)   Final Result      No evidence for acute cardiopulmonary disease. CT Head WO Contrast   Final Result      1. No intracranial hemorrhage, mass effect or large acute territorial infarction          Assessment/Plan   1. ESRD     2. HTN    3. Anemia    4. Acid- base/ Electrolyte imbalance     5.  N/V     6. Lupus     Plan   - HD yesterday   - cont cellcept   - has AVF for HD   - r/o infection   - Hb in good range   - labs in am                     Thank you for allowing us to participate in care of [facility]  Feel free to contact me   [facility] [address]  Office : [phone]  Fax :[phone]

## 2022-01-27 NOTE — DISCHARGE SUMMARY
Hospital Discharge Summary    Patient's PCP: Maggy primary care provider on file. Admit Date: 1/4/2022   Discharge Date: 1/27/2022    Admitting Physician: Dr. Danish Pereria DO  Discharge Physician: Dr. Shawn Barber MD     HPI: 35 yo female admitted with esrd on hd   Brief hospital course:  Given the concern of the patients presentation and the concern of the possible multi-factorial etiology contributing to patients symptomatology. Patient was admitted and evaluated and found to have acute encephalopathy from medications alteration at home. I discussed the case with the patients significant other as well. This patient left ama in am of 1/5 I did not see her, this is  A late entry on 1/27. Please see full chart but apparently her mentation returned to normal.     Discharge Diagnoses:   Patient Active Problem List   Diagnosis    Acute encephalopathy    [facility]D (end stage [facility] disease) [facility] [facility]    Lupus nephritis ([facility] [facility]    Electrolyte imbalance       Physical Exam: /82   Pulse 60   Temp 99.6 °F (37.6 °C) (Oral)   Resp 18   Ht 5' 6\" (1.676 m)   Wt 142 lb 10.2 oz (64.7 kg)   SpO2 98%   BMI 23.02 kg/m²     No results for input(s): POCGLU in the last 72 hours. LABS:  No results for input(s): WBC, HGB, PLT in the last 72 hours. No results for input(s): NA, K, CL, CO2, BUN, CREATININE, GLUCOSE in the last 72 hours. No results for input(s): INR in the last 72 hours. Discharge Medications:     Medication List      ASK your doctor about these medications    mycophenolate 500 MG tablet  Commonly known as: CELLCEPT     NIFEdipine 90 MG extended release tablet  Commonly known as: ADALAT CC     predniSONE 10 MG tablet  Commonly known as: DELTASONE     sevelamer 800 MG tablet  Commonly known as: RENVELA           Disposition: home  Discharged Condition: Stable  Follow Up: Primary Care Physician in         Thank you Dr. Winter primary care provider on file.  for the opportunity to be involved in this patients care. If you have any questions or concerns please feel free to contact me at [phone].

## 2022-02-11 NOTE — PROGRESS NOTES
Physician Progress Note      PATIENTAbdullahi Piper  CSN #:                  [id number]  :                       1990  ADMIT DATE:       2022 12:22 AM  [facility] DATE:        2022 8:56 AM  RESPONDING  PROVIDER #:        Lake Gonzalez MD          QUERY TEXT:    Pt admitted with  encephalopathy. If possible, please document in progress   notes and discharge summary further specificity regarding the type of   encephalopathy:      The medical record reflects the following:  Risk Factors: ESRD, SLE  Clinical Indicators: Electrolytes: Gluc-67, K-4.6, LA-2  Initial suspicion for patient's presentation is broad, including metabolic   derangements, uremic encephalopathy being the 2 main considerations.  -I suspect that her symptoms may be due in part to needing dialysis. -Unclear about last HD  HD arranged for today  H&P: 37 yo female admitted with acute metabolic encephalopathy - her bun isnt   terribly high. -D/C summary: Patient was admitted and evaluated and found to have acute   encephalopathy from medications alteration at home. Treatment: HD, lab trend, Nephrology consult. Options provided:  -- Metabolic encephalopathy due to ESRD  -- Metabolic encephalopathy due to electrolyte abnormalities  -- Metabolic encephalopathy due to hypoglycemia  -- Toxic encephalopathy due to medications  -- Other - I will add my own diagnosis  -- Disagree - Not applicable / Not valid  -- Disagree - Clinically unable to determine / Unknown  -- Refer to Clinical Documentation Reviewer    PROVIDER RESPONSE TEXT:    This patient has metabolic encephalopathy due to ESRD.     Query created by: Judy Dahl on 2/10/2022 9:12 AM      Electronically signed by:  Lake Gonzalez MD 2022 2:29 PM